# Patient Record
Sex: FEMALE | Race: BLACK OR AFRICAN AMERICAN | NOT HISPANIC OR LATINO | Employment: UNEMPLOYED | URBAN - METROPOLITAN AREA
[De-identification: names, ages, dates, MRNs, and addresses within clinical notes are randomized per-mention and may not be internally consistent; named-entity substitution may affect disease eponyms.]

---
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
## 2025-03-25 ENCOUNTER — TELEPHONE (OUTPATIENT)
Dept: OBSTETRICS AND GYNECOLOGY | Facility: CLINIC | Age: 20
End: 2025-03-25
Payer: MEDICAID

## 2025-03-25 NOTE — TELEPHONE ENCOUNTER
----- Message from Gin sent at 3/25/2025 11:07 AM CDT -----  Type: NOB Confirmation Who Called: Maria Teresa Dashymptoms : NOBMissed cycle: yes Home tested: yes ( x's2) Would the patient rather a call back or a response via [facility]?  Best call back number: [phone] Info:  pt needs confirmation please call (OB was confirmed at [facility] )

## 2025-04-09 DIAGNOSIS — Z34.91 FIRST TRIMESTER PREGNANCY: Primary | ICD-10-CM

## 2025-04-14 ENCOUNTER — PROCEDURE VISIT (OUTPATIENT)
Dept: OBSTETRICS AND GYNECOLOGY | Facility: CLINIC | Age: 20
End: 2025-04-14
Payer: MEDICAID

## 2025-04-14 ENCOUNTER — TELEPHONE (OUTPATIENT)
Dept: OBSTETRICS AND GYNECOLOGY | Facility: CLINIC | Age: 20
End: 2025-04-14
Payer: MEDICAID

## 2025-04-14 DIAGNOSIS — Z34.91 FIRST TRIMESTER PREGNANCY: Primary | ICD-10-CM

## 2025-04-14 DIAGNOSIS — Z34.91 FIRST TRIMESTER PREGNANCY: ICD-10-CM

## 2025-04-19 LAB
ABS NRBC COUNT: 0 X 10 3/UL (ref 0–0.01)
ABSOLUTE BASOPHIL: 0.06 X 10 3/UL (ref 0–0.22)
ABSOLUTE EOSINOPHIL: 0.08 X 10 3/UL (ref 0.04–0.54)
ABSOLUTE IMMATURE GRAN: 0.02 X 10 3/UL (ref 0–0.04)
ABSOLUTE LYMPHOCYTE: 1.52 X 10 3/UL (ref 0.86–4.75)
ABSOLUTE MONOCYTE: 0.62 X 10 3/UL (ref 0.22–1.08)
ANTIBODY SCREEN: NEGATIVE
BASOPHILS NFR BLD: 1.1 % (ref 0.2–1.2)
BLOOD GROUPING: NORMAL
BLOOD TYPE (D): POSITIVE
EOSINOPHIL NFR BLD: 1.4 % (ref 0.7–7)
HBV SURFACE AG SERPL QL IA: NONREACTIVE
HCT VFR BLD AUTO: 41.8 % (ref 37–47)
HCV IGG SERPL QL IA: NONREACTIVE
HGB BLD-MCNC: 13.7 G/DL (ref 12–16)
HIV 1+2 AB+HIV1 P24 AG SERPL QL IA: NONREACTIVE
IMMATURE GRANULOCYTES: 0.4 % (ref 0–0.5)
LYMPHOCYTES NFR BLD: 27.1 % (ref 19.3–53.1)
MCH RBC QN AUTO: 28.6 PG (ref 27–32)
MCHC RBC AUTO-ENTMCNC: 32.8 G/DL (ref 32–36)
MCV RBC AUTO: 87.3 FL (ref 82–100)
MONOCYTES NFR BLD: 11.1 % (ref 4.7–12.5)
NEUTROPHILS # BLD AUTO: 3.3 X 10 3/UL (ref 2.15–7.56)
NEUTROPHILS NFR BLD: 58.9 % (ref 34–71.1)
NUCLEATED RED BLOOD CELLS: 0 /100 WBC (ref 0–0.2)
PLATELET # BLD AUTO: 295 X 10 3/UL (ref 135–400)
RBC # BLD AUTO: 4.79 X 10 6/UL (ref 4.2–5.4)
RDW-SD: 41.1 FL (ref 37–54)
RUBELLA IGG SCREEN: POSITIVE
SICKLE CELL PREP: NEGATIVE
SMEAR REVIEW: NORMAL
SYPHILIS TREPONEMAL ANTIBODY: NONREACTIVE
URINE CULTURE, ROUTINE: NORMAL
WBC # BLD: 5.6 X 10 3/UL (ref 4.3–10.8)

## 2025-04-28 ENCOUNTER — INITIAL PRENATAL (OUTPATIENT)
Dept: OBSTETRICS AND GYNECOLOGY | Facility: CLINIC | Age: 20
End: 2025-04-28
Payer: MEDICAID

## 2025-04-28 VITALS — SYSTOLIC BLOOD PRESSURE: 133 MMHG | HEART RATE: 80 BPM | DIASTOLIC BLOOD PRESSURE: 64 MMHG | WEIGHT: 174.63 LBS

## 2025-04-28 DIAGNOSIS — Z11.3 SCREENING FOR STD (SEXUALLY TRANSMITTED DISEASE): ICD-10-CM

## 2025-04-28 DIAGNOSIS — Z3A.11 11 WEEKS GESTATION OF PREGNANCY: ICD-10-CM

## 2025-04-28 DIAGNOSIS — Z34.01 ENCOUNTER FOR SUPERVISION OF NORMAL FIRST PREGNANCY IN FIRST TRIMESTER: Primary | ICD-10-CM

## 2025-04-28 PROBLEM — Z34.91 ENCOUNTER FOR SUPERVISION OF NORMAL PREGNANCY IN FIRST TRIMESTER: Status: ACTIVE | Noted: 2025-04-28

## 2025-04-28 PROCEDURE — 99204 OFFICE O/P NEW MOD 45 MIN: CPT | Mod: S$PBB,TH,, | Performed by: STUDENT IN AN ORGANIZED HEALTH CARE EDUCATION/TRAINING PROGRAM

## 2025-04-28 NOTE — PROGRESS NOTES
CC: Initial OB visit    HPI:  19 y.o. at 11w0d with EDC of 2025, by 9w Ultrasound.  Complains of nothing today.    ROS:  Negative unless mentioned above    PMH: ADD, IBS, depression  PSH: none  Meds: none  ALL: NKDA   OB Hx:   SOC: denies S/E/D, reports stopping tob use  FH: denies family history of congenital defects, mental retardation, twins, cystic fibrosis, Down's syndrome, sickle cell, NTD's, cleft lip/palate, cardiac defects, abdominal wall defects, GYN cancers   GYN Hx: no past history STD's,  Negative History of HSV,  Negative History of Abnormal PAP    PHYSICAL EXAM  Prenatal Vitals  BP: 133/64  Weight: 79.2 kg (174 lb 9.6 oz)    There is no height or weight on file to calculate BMI.    Wt Readings from Last 3 Encounters:   25 79.2 kg (174 lb 9.6 oz) (93%, Z= 1.50)*   24 84.4 kg (186 lb) (96%, Z= 1.75)*   24 85.7 kg (189 lb) (96%, Z= 1.81)*     * Growth percentiles are based on CDC (Girls, 2-20 Years) data.     General: NAD, well developed, well nourished  Psych: alert and oriented to person, time and place, normal affect  HEENT: normocephalic, atraumatic  Gravid, soft, NT  Skin: warm, dry  Neuro: normal gait, gross motor function intact  Pelvic: NEFG. Normal vaginal mucosa, pink/ruggated, no lesions or discharge. Cvx closed, nonfriable  No cyanosis, clubbing or edema    PNL reviewed: wnl     ASSESSMENT: Patient is a 19 y.o.  at 11w0d with  Problem List[1]    PLAN:  NIPT today, AFP on RTC  PNL - reviewed, GC/CZ, PAP - not indicated   PNV, Iron  Pain, Fever, Bleeding Precautions  STEVEN, ANNA, PreE precautions  Encouraged Breast feeding  F/U in 4 weeks           [1]   Patient Active Problem List  Diagnosis    Encounter for supervision of normal pregnancy in first trimester

## 2025-05-06 ENCOUNTER — PATIENT MESSAGE (OUTPATIENT)
Dept: OBSTETRICS AND GYNECOLOGY | Facility: CLINIC | Age: 20
End: 2025-05-06
Payer: MEDICAID

## 2025-06-02 ENCOUNTER — PATIENT MESSAGE (OUTPATIENT)
Dept: OTHER | Facility: OTHER | Age: 20
End: 2025-06-02
Payer: MEDICAID

## 2025-06-06 ENCOUNTER — ROUTINE PRENATAL (OUTPATIENT)
Dept: OBSTETRICS AND GYNECOLOGY | Facility: CLINIC | Age: 20
End: 2025-06-06
Payer: MEDICAID

## 2025-06-06 ENCOUNTER — PATIENT MESSAGE (OUTPATIENT)
Dept: OBSTETRICS AND GYNECOLOGY | Facility: CLINIC | Age: 20
End: 2025-06-06

## 2025-06-06 VITALS — WEIGHT: 177 LBS | DIASTOLIC BLOOD PRESSURE: 84 MMHG | HEART RATE: 92 BPM | SYSTOLIC BLOOD PRESSURE: 134 MMHG

## 2025-06-06 DIAGNOSIS — Z34.02 ENCOUNTER FOR SUPERVISION OF NORMAL FIRST PREGNANCY IN SECOND TRIMESTER: Primary | ICD-10-CM

## 2025-06-06 DIAGNOSIS — Z3A.16 16 WEEKS GESTATION OF PREGNANCY: ICD-10-CM

## 2025-06-09 ENCOUNTER — PATIENT MESSAGE (OUTPATIENT)
Dept: OTHER | Facility: OTHER | Age: 20
End: 2025-06-09
Payer: MEDICAID

## 2025-06-30 ENCOUNTER — PATIENT MESSAGE (OUTPATIENT)
Dept: OTHER | Facility: OTHER | Age: 20
End: 2025-06-30
Payer: MEDICAID

## 2025-07-02 DIAGNOSIS — Z34.02 ENCOUNTER FOR SUPERVISION OF NORMAL FIRST PREGNANCY IN SECOND TRIMESTER: Primary | ICD-10-CM

## 2025-07-07 ENCOUNTER — PROCEDURE VISIT (OUTPATIENT)
Dept: OBSTETRICS AND GYNECOLOGY | Facility: CLINIC | Age: 20
End: 2025-07-07
Payer: MEDICAID

## 2025-07-07 DIAGNOSIS — Z34.02 ENCOUNTER FOR SUPERVISION OF NORMAL FIRST PREGNANCY IN SECOND TRIMESTER: ICD-10-CM

## 2025-07-07 PROCEDURE — 76805 OB US >/= 14 WKS SNGL FETUS: CPT | Mod: 26,S$PBB,, | Performed by: STUDENT IN AN ORGANIZED HEALTH CARE EDUCATION/TRAINING PROGRAM

## 2025-07-21 ENCOUNTER — ROUTINE PRENATAL (OUTPATIENT)
Dept: OBSTETRICS AND GYNECOLOGY | Facility: CLINIC | Age: 20
End: 2025-07-21
Payer: MEDICAID

## 2025-07-21 VITALS — SYSTOLIC BLOOD PRESSURE: 119 MMHG | DIASTOLIC BLOOD PRESSURE: 87 MMHG | WEIGHT: 179 LBS | HEART RATE: 98 BPM

## 2025-07-21 DIAGNOSIS — Z34.02 ENCOUNTER FOR SUPERVISION OF NORMAL FIRST PREGNANCY IN SECOND TRIMESTER: Primary | ICD-10-CM

## 2025-07-21 DIAGNOSIS — Z3A.23 23 WEEKS GESTATION OF PREGNANCY: ICD-10-CM

## 2025-07-21 PROCEDURE — 99214 OFFICE O/P EST MOD 30 MIN: CPT | Mod: S$PBB,TH,, | Performed by: STUDENT IN AN ORGANIZED HEALTH CARE EDUCATION/TRAINING PROGRAM

## 2025-07-21 NOTE — PROGRESS NOTES
CC: Follow-Up OB    HPI:   19 y.o.  at 23w0d with EDC of 2025, by Ultrasound, here for her follow up OB visit. Complains of nothing today.    Pregnancy ROS:  Positive fetal movement   Negative leakage of fluid   Negative vaginal bleeding   Negative headache, vision changes, RUQ pain, epigastric pain  Negative contractions/abdominal pain   Negative pelvic pressure     Physical Exam:  Prenatal Vitals  BP: 119/87  Weight: 81.2 kg (179 lb)    Wt Readings from Last 3 Encounters:   25 81.2 kg (179 lb) (94%, Z= 1.58)*   25 80.3 kg (177 lb) (94%, Z= 1.54)*   25 79.2 kg (174 lb 9.6 oz) (93%, Z= 1.50)*     * Growth percentiles are based on [facility] (Girls, 2-20 Years) data.       There is no height or weight on file to calculate BMI.    General: NAD, well developed, well nourished  Psych: alert and oriented to person, time and place, normal affect  HEENT: normocephalic, atraumatic  Abd: Gravid, soft, NT, ND  Skin: warm, dry  Neuro: normal gait, gross motor function intact  No cyanosis, clubbing or edema    FHTs: +    Maternal Blood Type: O+/-    ASSESSMENT: 19 y.o.  @ 23w0d with   Problem List[1]    PLAN:  AFP negative  Anatomy scan reviewed  Osull on RTC  Pain, fever, bleeding precautions  STEVEN, ANNA, PreE precautions  Cont PNV, Iron  Return to clinic in 3 weeks           [1]   Patient Active Problem List  Diagnosis    Encounter for supervision of normal pregnancy in first trimester

## 2025-07-28 ENCOUNTER — PATIENT MESSAGE (OUTPATIENT)
Dept: OTHER | Facility: OTHER | Age: 20
End: 2025-07-28
Payer: MEDICAID

## 2025-08-11 ENCOUNTER — PATIENT MESSAGE (OUTPATIENT)
Dept: OTHER | Facility: OTHER | Age: 20
End: 2025-08-11
Payer: MEDICAID

## 2025-08-11 ENCOUNTER — ROUTINE PRENATAL (OUTPATIENT)
Dept: OBSTETRICS AND GYNECOLOGY | Facility: CLINIC | Age: 20
End: 2025-08-11
Payer: MEDICAID

## 2025-08-11 VITALS — WEIGHT: 182 LBS | SYSTOLIC BLOOD PRESSURE: 119 MMHG | HEART RATE: 77 BPM | DIASTOLIC BLOOD PRESSURE: 75 MMHG

## 2025-08-11 DIAGNOSIS — Z3A.26 26 WEEKS GESTATION OF PREGNANCY: ICD-10-CM

## 2025-08-11 DIAGNOSIS — Z34.02 ENCOUNTER FOR SUPERVISION OF NORMAL FIRST PREGNANCY IN SECOND TRIMESTER: Primary | ICD-10-CM

## 2025-08-11 PROCEDURE — 99214 OFFICE O/P EST MOD 30 MIN: CPT | Mod: S$PBB,TH,, | Performed by: STUDENT IN AN ORGANIZED HEALTH CARE EDUCATION/TRAINING PROGRAM

## 2025-08-25 ENCOUNTER — PATIENT MESSAGE (OUTPATIENT)
Dept: OTHER | Facility: OTHER | Age: 20
End: 2025-08-25
Payer: MEDICAID

## 2025-08-25 ENCOUNTER — ROUTINE PRENATAL (OUTPATIENT)
Dept: OBSTETRICS AND GYNECOLOGY | Facility: CLINIC | Age: 20
End: 2025-08-25
Payer: MEDICAID

## 2025-08-25 VITALS — DIASTOLIC BLOOD PRESSURE: 79 MMHG | WEIGHT: 183 LBS | SYSTOLIC BLOOD PRESSURE: 126 MMHG | HEART RATE: 101 BPM

## 2025-08-25 DIAGNOSIS — Z34.03 ENCOUNTER FOR SUPERVISION OF NORMAL FIRST PREGNANCY IN THIRD TRIMESTER: Primary | ICD-10-CM

## 2025-08-25 DIAGNOSIS — Z3A.28 28 WEEKS GESTATION OF PREGNANCY: ICD-10-CM

## 2025-08-25 PROCEDURE — 99214 OFFICE O/P EST MOD 30 MIN: CPT | Mod: S$PBB,TH,, | Performed by: STUDENT IN AN ORGANIZED HEALTH CARE EDUCATION/TRAINING PROGRAM
